# Patient Record
Sex: FEMALE | Race: WHITE
[De-identification: names, ages, dates, MRNs, and addresses within clinical notes are randomized per-mention and may not be internally consistent; named-entity substitution may affect disease eponyms.]

---
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
## 2025-02-05 NOTE — RAD
AP and lateral views of the right tibia and fibula no comparison.



INDICATION: Trauma with swelling 2 weeks ago now with continued pain.



FINDINGS:



No fracture subluxation dislocation. No abnormal soft tissue swelling.



Electronically signed by: Abdirizak Marin MD (5/4/2021 10:45 AM) [email or web address]
Right lower extremity venous real time grayscale, color and spectral duplex ultrasound was performed.
 



History: Reason: trauma 2 weeks ago, pain, swelling / Spl. Instructions:  / History: 



Comparison: None

 

The right common femoral, femoral, and popliteal veins   demonstrate anechoic lumina, full compressib
ility, augmentable waveforms, and cephalad color Doppler flow.  The posterior tibial  are also patent
. Normal flow is also seen in the cephalad portion of the saphenous vein.

 

Impression: No evidence of DVT in the right lower extremity. 



Electronically signed by: Abdirizak Marin MD (5/4/2021 11:42 AM) [email or web address]
Name band;